# Patient Record
Sex: FEMALE | Employment: OTHER | URBAN - METROPOLITAN AREA
[De-identification: names, ages, dates, MRNs, and addresses within clinical notes are randomized per-mention and may not be internally consistent; named-entity substitution may affect disease eponyms.]

---

## 2019-06-25 PROBLEM — S81.801A OPEN WOUND OF RIGHT LOWER LEG: Status: ACTIVE | Noted: 2019-05-23

## 2019-06-25 PROBLEM — L08.9 INFECTION OF ANTERIOR LOWER LEG: Status: ACTIVE | Noted: 2019-06-15

## 2019-06-25 PROBLEM — Z00.00 MEDICARE ANNUAL WELLNESS VISIT, SUBSEQUENT: Status: ACTIVE | Noted: 2018-02-28

## 2019-06-25 PROBLEM — F32.A DEPRESSION: Status: ACTIVE | Noted: 2018-02-28

## 2019-06-27 ENCOUNTER — TELEPHONE (OUTPATIENT)
Dept: INFECTIOUS DISEASES | Age: 74
End: 2019-06-27

## 2019-06-27 DIAGNOSIS — R19.7 DIARRHEA, UNSPECIFIED TYPE: Primary | ICD-10-CM

## 2019-06-27 NOTE — TELEPHONE ENCOUNTER
Becky Clarke MD   0\"   2019 1:51 PM   Qing Eugene, : 43. Seen at [facility]. Is on Keflex 500mg, 4 times a day. She is complaining of severe diarrhea with accidents. No pain, cramping or other symptoms. She has 3 days left and 2 doses due today. She has an appt with you  in office. What do you want her to do? Stop? [place] Albert[place]  Read 2019 1:52 PM   0\"   2019 1:52 PM   Check stool cdiff and wbc  0\"   2019 1:52 PM   What are we treating  0\"   2019 2:05 PM   Right lower extremity cellulitis. Diarrhea C diff negative  Suspected sepsis  Right lower extremity cellulitis, improving  History of Group A streptococcal bacteremia 2019  History of right lower extremity necrotizing fasciitis status post debridement and fasciotomies 2019  History of hypertension  History of dyslipidemia  History of group B streptococcal bacteriuria 2019  Reported sulfa allergy  Read 2019 2:15 PM   0\"   2019 2:16 PM   If not feeling good go to ER. Take anbx 2times a day  Check stool for cdiff and wbc  0\"   0\"   2019 3:06 PM   Okay, thank you. I'm going to put her orders in right now in Epic. Sign them when you can.   Read 2019 3:07 PM   0\"   2019 3:33 PM   there in

## 2019-07-25 ENCOUNTER — OFFICE VISIT (OUTPATIENT)
Dept: INFECTIOUS DISEASES | Age: 74
End: 2019-07-25
Payer: MEDICARE

## 2019-07-25 ENCOUNTER — HOSPITAL ENCOUNTER (OUTPATIENT)
Age: 74
Setting detail: SPECIMEN
Discharge: HOME | End: 2019-07-25
Payer: MEDICARE

## 2019-07-25 VITALS
OXYGEN SATURATION: 98 % | BODY MASS INDEX: 28.16 KG/M2 | HEART RATE: 50 BPM | DIASTOLIC BLOOD PRESSURE: 61 MMHG | HEIGHT: 62 IN | WEIGHT: 153 LBS | SYSTOLIC BLOOD PRESSURE: 100 MMHG

## 2019-07-25 DIAGNOSIS — S81.801D OPEN WOUND OF RIGHT LOWER LEG, SUBSEQUENT ENCOUNTER: ICD-10-CM

## 2019-07-25 DIAGNOSIS — S81.801D OPEN WOUND OF RIGHT LOWER LEG, SUBSEQUENT ENCOUNTER: Primary | ICD-10-CM

## 2019-07-25 PROBLEM — Z00.00 MEDICARE ANNUAL WELLNESS VISIT, SUBSEQUENT: Status: RESOLVED | Noted: 2018-02-28 | Resolved: 2019-07-25

## 2019-07-25 LAB
ABSOLUTE EOS #: 0.35 K/UL (ref 0–0.44)
ABSOLUTE IMMATURE GRANULOCYTE: 0.03 K/UL (ref 0–0.3)
ABSOLUTE LYMPH #: 1.89 K/UL (ref 1.1–3.7)
ABSOLUTE MONO #: 0.48 K/UL (ref 0.1–1.2)
BASOPHILS # BLD: 1 % (ref 0–2)
BASOPHILS ABSOLUTE: 0.09 K/UL (ref 0–0.2)
DIFFERENTIAL TYPE: ABNORMAL
EOSINOPHILS RELATIVE PERCENT: 5 % (ref 1–4)
IMMATURE GRANULOCYTES: 0 %
LYMPHOCYTES # BLD: 27 % (ref 24–43)
MONOCYTES # BLD: 7 % (ref 3–12)
PLATELET ESTIMATE: ABNORMAL
RBC # BLD: ABNORMAL 10*6/UL
SEG NEUTROPHILS: 60 % (ref 36–65)
SEGMENTED NEUTROPHILS ABSOLUTE COUNT: 4.18 K/UL (ref 1.5–8.1)
WBC # BLD: 7 K/UL (ref 3.5–11.3)
WBC # BLD: ABNORMAL 10*3/UL

## 2019-07-25 PROCEDURE — 99213 OFFICE O/P EST LOW 20 MIN: CPT | Performed by: INTERNAL MEDICINE

## 2019-07-25 RX ORDER — METOPROLOL SUCCINATE 50 MG/1
50 TABLET, EXTENDED RELEASE ORAL
COMMUNITY
Start: 2019-07-03 | End: 2019-09-11

## 2019-07-25 RX ORDER — OXYCODONE HYDROCHLORIDE AND ACETAMINOPHEN 5; 325 MG/1; MG/1
1 TABLET ORAL
COMMUNITY

## 2019-07-25 RX ORDER — NAPROXEN 500 MG/1
500 TABLET ORAL
COMMUNITY
Start: 2019-07-10

## 2019-07-25 RX ORDER — ESZOPICLONE 3 MG/1
3 TABLET, FILM COATED ORAL NIGHTLY
COMMUNITY

## 2019-07-25 RX ORDER — ESCITALOPRAM OXALATE 20 MG/1
20 TABLET ORAL DAILY
COMMUNITY

## 2019-07-25 RX ORDER — SPIRONOLACTONE 25 MG/1
25 TABLET ORAL
COMMUNITY
Start: 2019-07-03 | End: 2019-09-11 | Stop reason: SINTOL

## 2019-09-11 ENCOUNTER — OFFICE VISIT (OUTPATIENT)
Dept: INFECTIOUS DISEASES | Age: 74
End: 2019-09-11
Payer: MEDICARE

## 2019-09-11 VITALS
HEART RATE: 68 BPM | SYSTOLIC BLOOD PRESSURE: 131 MMHG | WEIGHT: 157 LBS | OXYGEN SATURATION: 98 % | DIASTOLIC BLOOD PRESSURE: 78 MMHG | HEIGHT: 62 IN | TEMPERATURE: 97.3 F | BODY MASS INDEX: 28.89 KG/M2

## 2019-09-11 DIAGNOSIS — S81.801D OPEN WOUND OF RIGHT LOWER LEG, SUBSEQUENT ENCOUNTER: Primary | ICD-10-CM

## 2019-09-11 PROCEDURE — 99213 OFFICE O/P EST LOW 20 MIN: CPT | Performed by: INTERNAL MEDICINE

## 2019-09-11 NOTE — PROGRESS NOTES
[facility]    Follow-up NOTE           Visit date: 9/11/2019      Reason for visit /chief complaints   Wound     Assessment:     Encounter Diagnosis   Name Primary?  Open wound of right lower leg, subsequent encounter Yes     Right lower extremity cellulitis, improving  History of Group A streptococcal bacteremia April 2019  History of right lower extremity necrotizing fasciitis status post debridement and fasciotomies  History of hypertension  History of dyslipidemia  History of group B streptococcal bacteriuria 4/19/2019  Reported sulfa allergy        Plan:     Status post right lower extremity skin graft,   No signs of infection of the surgical site  Continue supportive care        HPI:    Patient is 71-year-old female came in for follow-up  I am following her for right lower extremity necrotizing fasciitis status post IND and antibiotic treatment. She has skin graft on the right lower ext wound last month . No fever chills. No vomiting or diarrhea. No new rash. Past Medical History:   Diagnosis Date    Depression 2/28/2018    Infection of anterior lower leg 6/15/2019    Medicare annual wellness visit, subsequent 2/28/2018    Open wound of right lower leg 5/23/2019     No past surgical history on file.   Social History     Socioeconomic History    Marital status:      Spouse name: None    Number of children: None    Years of education: None    Highest education level: None   Occupational History    None   Social Needs    Financial resource strain: None    Food insecurity:     Worry: None     Inability: None    Transportation needs:     Medical: None     Non-medical: None   Tobacco Use    Smoking status: Never Smoker    Smokeless tobacco: Never Used   Substance and Sexual Activity    Alcohol use: None    Drug use: None    Sexual activity: None   Lifestyle    Physical activity:     Days per week: None     Minutes per session: None    Stress: None (1.575 m)   Wt 157 lb (71.2 kg)   SpO2 98%   BMI 28.72 kg/m²       EXAM:  CONSTITUTIONAL:  awake, alert, cooperative, no apparent distress,  EYES: conjunctiva normal  ENT:  Normocephalic, without obvious abnormality, atraumatic,  LUNGS:  No increased work of breathing, good air exchange, clear to auscultation bilaterally, no crackles or wheezing  CARDIOVASCULAR:  regular rate and rhythm, normal S1 and S2,  no murmur noted  ABDOMEN:   normal bowel sounds, soft, non-distended, non-tender, no masses palpated, no hepatosplenomegally,   MUSCULOSKELETAL: Right lower extremity graft site looks ok,   NEUROLOGIC:  Awake, alert, oriented to name, place and time  SKIN:  No rash      Data Review:    Reviewed      IMAGING:          Carly Mattson MD  9/11/2019  12:57 PM      This note was completed using a voice transcription system. Every effort was made to ensure accuracy. However, inadvertent computerized transcription errors may be present.

## 2023-07-22 ENCOUNTER — APPOINTMENT (OUTPATIENT)
Dept: GENERAL RADIOLOGY | Age: 78
End: 2023-07-22
Payer: MEDICARE

## 2023-07-22 ENCOUNTER — HOSPITAL ENCOUNTER (EMERGENCY)
Age: 78
Discharge: HOME | End: 2023-07-22
Attending: EMERGENCY MEDICINE
Payer: MEDICARE

## 2023-07-22 VITALS
RESPIRATION RATE: 22 BRPM | HEIGHT: 62 IN | WEIGHT: 148 LBS | SYSTOLIC BLOOD PRESSURE: 99 MMHG | BODY MASS INDEX: 27.23 KG/M2 | DIASTOLIC BLOOD PRESSURE: 41 MMHG | OXYGEN SATURATION: 97 % | TEMPERATURE: 98.1 F | HEART RATE: 68 BPM

## 2023-07-22 DIAGNOSIS — I48.0 PAROXYSMAL ATRIAL FIBRILLATION (HCC): Primary | ICD-10-CM

## 2023-07-22 LAB
ANION GAP SERPL CALCULATED.3IONS-SCNC: 11 MMOL/L (ref 9–17)
BASOPHILS # BLD: 0 K/UL (ref 0–0.2)
BASOPHILS NFR BLD: 1 % (ref 0–2)
BNP SERPL-MCNC: 1087 PG/ML
BUN SERPL-MCNC: 25 MG/DL (ref 8–23)
CALCIUM SERPL-MCNC: 8.8 MG/DL (ref 8.6–10.4)
CHLORIDE SERPL-SCNC: 103 MMOL/L (ref 98–107)
CO2 SERPL-SCNC: 23 MMOL/L (ref 20–31)
CREAT SERPL-MCNC: 0.9 MG/DL (ref 0.5–0.9)
EKG ATRIAL RATE: 69 BPM
EKG P AXIS: 59 DEGREES
EKG P-R INTERVAL: 188 MS
EKG Q-T INTERVAL: 434 MS
EKG QRS DURATION: 120 MS
EKG QTC CALCULATION (BAZETT): 465 MS
EKG R AXIS: 2 DEGREES
EKG T AXIS: 117 DEGREES
EKG VENTRICULAR RATE: 69 BPM
EOSINOPHIL # BLD: 0.1 K/UL (ref 0–0.4)
EOSINOPHILS RELATIVE PERCENT: 2 % (ref 1–4)
ERYTHROCYTE [DISTWIDTH] IN BLOOD BY AUTOMATED COUNT: 13.3 % (ref 12.5–15.4)
GFR SERPL CREATININE-BSD FRML MDRD: >60 ML/MIN/1.73M2
GLUCOSE SERPL-MCNC: 115 MG/DL (ref 70–99)
HCT VFR BLD AUTO: 39.9 % (ref 36–46)
HGB BLD-MCNC: 13.4 G/DL (ref 12–16)
LYMPHOCYTES NFR BLD: 1.4 K/UL (ref 1–4.8)
LYMPHOCYTES RELATIVE PERCENT: 27 % (ref 24–44)
MCH RBC QN AUTO: 30.5 PG (ref 26–34)
MCHC RBC AUTO-ENTMCNC: 33.6 G/DL (ref 31–37)
MCV RBC AUTO: 90.6 FL (ref 80–100)
MONOCYTES NFR BLD: 0.5 K/UL (ref 0.1–1.2)
MONOCYTES NFR BLD: 9 % (ref 2–11)
NEUTROPHILS NFR BLD: 61 % (ref 36–66)
NEUTS SEG NFR BLD: 3.3 K/UL (ref 1.8–7.7)
PLATELET # BLD AUTO: 291 K/UL (ref 140–450)
PMV BLD AUTO: 7.5 FL (ref 6–12)
POTASSIUM SERPL-SCNC: 4.3 MMOL/L (ref 3.7–5.3)
RBC # BLD AUTO: 4.41 M/UL (ref 4–5.2)
SODIUM SERPL-SCNC: 137 MMOL/L (ref 135–144)
TROPONIN I SERPL HS-MCNC: 14 NG/L (ref 0–14)
TROPONIN I SERPL HS-MCNC: 15 NG/L (ref 0–14)
WBC OTHER # BLD: 5.3 K/UL (ref 3.5–11)

## 2023-07-22 PROCEDURE — 85027 COMPLETE CBC AUTOMATED: CPT

## 2023-07-22 PROCEDURE — 84484 ASSAY OF TROPONIN QUANT: CPT

## 2023-07-22 PROCEDURE — 71045 X-RAY EXAM CHEST 1 VIEW: CPT

## 2023-07-22 PROCEDURE — 80048 BASIC METABOLIC PNL TOTAL CA: CPT

## 2023-07-22 PROCEDURE — 99285 EMERGENCY DEPT VISIT HI MDM: CPT | Performed by: EMERGENCY MEDICINE

## 2023-07-22 PROCEDURE — 83880 ASSAY OF NATRIURETIC PEPTIDE: CPT

## 2023-07-22 PROCEDURE — 93005 ELECTROCARDIOGRAM TRACING: CPT | Performed by: EMERGENCY MEDICINE

## 2023-07-22 PROCEDURE — 36415 COLL VENOUS BLD VENIPUNCTURE: CPT

## 2023-07-22 ASSESSMENT — PAIN - FUNCTIONAL ASSESSMENT: PAIN_FUNCTIONAL_ASSESSMENT: NONE - DENIES PAIN

## 2023-07-22 NOTE — DISCHARGE INSTRUCTIONS
Follow-up with your cardiologist call for an appointment  Return immediately if any worsening symptoms or any other concerns    Tell us how we did visit: [email or web address]   and let us know about your experience

## 2023-07-22 NOTE — ED PROVIDER NOTES
[address] [facility] Emergency Department  [address] [id number]. [facility] [address]  Phone: [phone]  Fax: [phone] [employer]      Pt Name: Venkatesh Correia  MRN: [id number]  [address] [facility] [address] 1945  Date of evaluation: 7/22/2023    CHIEF COMPLAINT       Chief Complaint   Patient presents with    Dizziness       HISTORY OF PRESENT ILLNESS    Venkatesh Correia is a 77 y.o. female who presents to the emergency room with dizziness. Patient states that she woke up this morning felt lightheaded and went to check her blood pressure and it was low and felt like her heart was racing. 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 She has a history of atrial fibrillation and takes Eliquis. She is mostly on metoprolol however because of her chronic hypotension she does not take it. She denies any chest pain but does admit to feeling short of breath during the episode. When paramedics arrived they found her to be in A-fib with RVR but as soon as they started the IV her rhythm converted to sinus. She denies recent illnesses no fevers chills cough congestion. She denies any stents. She has had an echocardiogram in 2019 EF of 40%.     REVIEW OF SYSTEMS       Constitutional: No fevers or chills positive lightheadedness  HEENT: No sore throat, rhinorrhea, or earache   Eyes: No blurry vision or double vision no drainage   Cardiovascular: No chest pain positive tachycardia   Respiratory: No wheezing positive shortness of breath no cough   Gastrointestinal: No nausea, vomiting, diarrhea, constipation, or abdominal pain   : No hematuria or dysuria   Musculoskeletal: No swelling or pain   Skin: No rash   Neurological: No focal neurologic complaints, paresthesias, weakness, or headache     PAST MEDICAL HISTORY    has a past medical history of Depression, Infection of anterior lower leg, Medicare annual DISPOSITION/PLAN   DISPOSITION Decision To Discharge 07/22/2023 01:55:25 PM        CONDITION ON DISPOSITION: STABLE       PATIENT REFERRED TO:  Your cardiology    Call in 2 days        DISCHARGE MEDICATIONS:  New Prescriptions    No medications on file       (Please note that portions of this note were completed with a voice recognition program.  Efforts were made to edit the dictations but occasionally words are mis-transcribed. Additionally, portions of this note may also include information that was incorporated after care transfer to another provider that were not available at the time of my evaluation.   Some of this information could likely include laboratory values, vital sign updates, medications etc.)    Vi Wolfe DO   Attending Emergency Physician     Vi Wolfe DO  07/22/23 [id number]

## 2023-07-24 LAB
EKG ATRIAL RATE: 69 BPM
EKG ATRIAL RATE: 76 BPM
EKG P AXIS: 52 DEGREES
EKG P AXIS: 59 DEGREES
EKG P-R INTERVAL: 174 MS
EKG P-R INTERVAL: 188 MS
EKG Q-T INTERVAL: 390 MS
EKG Q-T INTERVAL: 434 MS
EKG QRS DURATION: 120 MS
EKG QRS DURATION: 134 MS
EKG QTC CALCULATION (BAZETT): 438 MS
EKG QTC CALCULATION (BAZETT): 465 MS
EKG R AXIS: -2 DEGREES
EKG R AXIS: 2 DEGREES
EKG T AXIS: 117 DEGREES
EKG T AXIS: 124 DEGREES
EKG VENTRICULAR RATE: 69 BPM
EKG VENTRICULAR RATE: 76 BPM